# Patient Record
Sex: FEMALE | Race: OTHER | Employment: UNEMPLOYED | URBAN - METROPOLITAN AREA
[De-identification: names, ages, dates, MRNs, and addresses within clinical notes are randomized per-mention and may not be internally consistent; named-entity substitution may affect disease eponyms.]

---

## 2017-09-06 ENCOUNTER — APPOINTMENT (OUTPATIENT)
Dept: CT IMAGING | Age: 32
End: 2017-09-06
Attending: EMERGENCY MEDICINE

## 2017-09-06 ENCOUNTER — HOSPITAL ENCOUNTER (EMERGENCY)
Age: 32
Discharge: HOME | End: 2017-09-06
Attending: EMERGENCY MEDICINE

## 2017-09-06 VITALS
HEART RATE: 74 BPM | WEIGHT: 143.31 LBS | DIASTOLIC BLOOD PRESSURE: 69 MMHG | RESPIRATION RATE: 16 BRPM | SYSTOLIC BLOOD PRESSURE: 115 MMHG | OXYGEN SATURATION: 100 %

## 2017-09-06 DIAGNOSIS — E87.6 HYPOKALEMIA: ICD-10-CM

## 2017-09-06 DIAGNOSIS — R20.2 PARESTHESIAS: Primary | ICD-10-CM

## 2017-09-06 LAB
ALBUMIN SERPL-MCNC: 4 G/DL (ref 3.5–4.8)
ALP LIVER SERPL-CCNC: 56 U/L (ref 37–98)
ALT SERPL-CCNC: 16 U/L (ref 14–54)
AST SERPL-CCNC: 18 U/L (ref 15–41)
ATRIAL RATE: 79 BPM
BASOPHILS # BLD AUTO: 0.05 X10(3) UL (ref 0–0.1)
BASOPHILS NFR BLD AUTO: 0.7 %
BILIRUB SERPL-MCNC: 0.8 MG/DL (ref 0.1–2)
BILIRUB UR QL STRIP.AUTO: NEGATIVE
BUN BLD-MCNC: 11 MG/DL (ref 8–20)
CALCIUM BLD-MCNC: 8.5 MG/DL (ref 8.3–10.3)
CHLORIDE: 102 MMOL/L (ref 101–111)
CLARITY UR REFRACT.AUTO: CLEAR
CO2: 21 MMOL/L (ref 22–32)
COLOR UR AUTO: YELLOW
CREAT BLD-MCNC: 0.91 MG/DL (ref 0.55–1.02)
EOSINOPHIL # BLD AUTO: 0.09 X10(3) UL (ref 0–0.3)
EOSINOPHIL NFR BLD AUTO: 1.2 %
ERYTHROCYTE [DISTWIDTH] IN BLOOD BY AUTOMATED COUNT: 12.7 % (ref 11.5–16)
GLUCOSE BLD-MCNC: 122 MG/DL (ref 70–99)
GLUCOSE BLD-MCNC: 139 MG/DL (ref 65–99)
GLUCOSE UR STRIP.AUTO-MCNC: NEGATIVE MG/DL
HAV IGM SER QL: 1.8 MG/DL (ref 1.7–3)
HCT VFR BLD AUTO: 40.4 % (ref 34–50)
HGB BLD-MCNC: 13.7 G/DL (ref 12–16)
IMMATURE GRANULOCYTE COUNT: 0.02 X10(3) UL (ref 0–1)
IMMATURE GRANULOCYTE RATIO %: 0.3 %
KETONES UR STRIP.AUTO-MCNC: 15 MG/DL
LEUKOCYTE ESTERASE UR QL STRIP.AUTO: NEGATIVE
LYMPHOCYTES # BLD AUTO: 2.39 X10(3) UL (ref 0.9–4)
LYMPHOCYTES NFR BLD AUTO: 32.8 %
M PROTEIN MFR SERPL ELPH: 7.5 G/DL (ref 6.1–8.3)
MCH RBC QN AUTO: 29.6 PG (ref 27–33.2)
MCHC RBC AUTO-ENTMCNC: 33.9 G/DL (ref 31–37)
MCV RBC AUTO: 87.3 FL (ref 81–100)
MONOCYTES # BLD AUTO: 0.47 X10(3) UL (ref 0.1–0.6)
MONOCYTES NFR BLD AUTO: 6.4 %
NEUTROPHIL ABS PRELIM: 4.27 X10 (3) UL (ref 1.3–6.7)
NEUTROPHILS # BLD AUTO: 4.27 X10(3) UL (ref 1.3–6.7)
NEUTROPHILS NFR BLD AUTO: 58.6 %
NITRITE UR QL STRIP.AUTO: NEGATIVE
P AXIS: 67 DEGREES
P-R INTERVAL: 158 MS
PH UR STRIP.AUTO: 6.5 [PH] (ref 4.5–8)
PLATELET # BLD AUTO: 264 10(3)UL (ref 150–450)
POCT LOT NUMBER: NORMAL
POCT URINE PREGNANCY: NEGATIVE
POTASSIUM SERPL-SCNC: 3.1 MMOL/L (ref 3.6–5.1)
PROT UR STRIP.AUTO-MCNC: NEGATIVE MG/DL
Q-T INTERVAL: 410 MS
QRS DURATION: 90 MS
QTC CALCULATION (BEZET): 470 MS
R AXIS: 68 DEGREES
RBC # BLD AUTO: 4.63 X10(6)UL (ref 3.8–5.1)
RBC UR QL AUTO: NEGATIVE
RED CELL DISTRIBUTION WIDTH-SD: 40.6 FL (ref 35.1–46.3)
SODIUM SERPL-SCNC: 134 MMOL/L (ref 136–144)
SP GR UR STRIP.AUTO: 1.01 (ref 1–1.03)
T AXIS: 53 DEGREES
UROBILINOGEN UR STRIP.AUTO-MCNC: 0.2 MG/DL
VENTRICULAR RATE: 79 BPM
WBC # BLD AUTO: 7.3 X10(3) UL (ref 4–13)

## 2017-09-06 PROCEDURE — 80053 COMPREHEN METABOLIC PANEL: CPT | Performed by: EMERGENCY MEDICINE

## 2017-09-06 PROCEDURE — 93005 ELECTROCARDIOGRAM TRACING: CPT

## 2017-09-06 PROCEDURE — 70450 CT HEAD/BRAIN W/O DYE: CPT | Performed by: EMERGENCY MEDICINE

## 2017-09-06 PROCEDURE — 83735 ASSAY OF MAGNESIUM: CPT | Performed by: EMERGENCY MEDICINE

## 2017-09-06 PROCEDURE — 85025 COMPLETE CBC W/AUTO DIFF WBC: CPT | Performed by: EMERGENCY MEDICINE

## 2017-09-06 PROCEDURE — 82962 GLUCOSE BLOOD TEST: CPT

## 2017-09-06 PROCEDURE — 96360 HYDRATION IV INFUSION INIT: CPT

## 2017-09-06 PROCEDURE — 99285 EMERGENCY DEPT VISIT HI MDM: CPT

## 2017-09-06 PROCEDURE — 81025 URINE PREGNANCY TEST: CPT

## 2017-09-06 PROCEDURE — 93010 ELECTROCARDIOGRAM REPORT: CPT

## 2017-09-06 PROCEDURE — 81003 URINALYSIS AUTO W/O SCOPE: CPT | Performed by: EMERGENCY MEDICINE

## 2017-09-06 PROCEDURE — 96361 HYDRATE IV INFUSION ADD-ON: CPT

## 2017-09-06 RX ORDER — POTASSIUM CHLORIDE 20 MEQ/1
40 TABLET, EXTENDED RELEASE ORAL ONCE
Status: COMPLETED | OUTPATIENT
Start: 2017-09-06 | End: 2017-09-06

## 2017-09-06 NOTE — ED INITIAL ASSESSMENT (HPI)
aprox 10am, while laying in bed, pt felt numbness to right arm, dizziness and shaking.  States she called her sister to bring her here

## 2017-09-06 NOTE — ED PROVIDER NOTES
Patient Seen in: [facility] In [place]    History   Patient presents with:  Dizziness (neurologic)    Stated Complaint: dizziness, shakiness, denies vomiting/diarrhea    HPI    Patient is a 25-year-old female, presenting for evaluatio chart.   GENERAL: Patient is awake and alert, resting comfortably on the cart, in no apparent distress. HEENT: Head is without evidence of trauma. Extraocular muscles are intact. Pupils are equal and reactive to light.  Oropharynx is pink and moist.  NECK DRAW LIGHT GREEN   RAINBOW DRAW GOLD     EKG: The EKG revealed rate, intervals, and axis as noted on the EKG report. I have reviewed and agree with these readings. Sinus rhythm at 79 beats per minute.     Ct Brain Or Head (64241)    Result Date: 9/6/2017  P Oral potassium supplementation was provided. Discharge and close outpatient follow-up was recommended. Component of anxiety is suspected. Patient should monitor her symptoms closely and follow up with a primary care physician.   Patient was invited to re

## (undated) NOTE — ED AVS SNAPSHOT
Michael Smallwood   MRN: [id number]    Department:  [facility] in [place]   Date of Visit:  9/6/2017           Disclosure     Insurance plans vary and the physician(s) referred by the ER may not be covered by your plan.  Please contact yo If you have been prescribed any medication(s), please fill your prescription right away and begin taking the medication(s) as directed    If the emergency physician has read X-rays, these will be re-interpreted by a radiologist.  If there is a significant